# Patient Record
Sex: MALE | Race: OTHER | HISPANIC OR LATINO | URBAN - METROPOLITAN AREA
[De-identification: names, ages, dates, MRNs, and addresses within clinical notes are randomized per-mention and may not be internally consistent; named-entity substitution may affect disease eponyms.]

---

## 2017-07-19 ENCOUNTER — INPATIENT (INPATIENT)
Facility: HOSPITAL | Age: 52
LOS: 3 days | Discharge: HOME | DRG: 460 | End: 2017-07-23
Attending: ORTHOPAEDIC SURGERY | Admitting: ORTHOPAEDIC SURGERY
Payer: OTHER MISCELLANEOUS

## 2017-07-19 VITALS
TEMPERATURE: 97 F | SYSTOLIC BLOOD PRESSURE: 116 MMHG | OXYGEN SATURATION: 98 % | RESPIRATION RATE: 16 BRPM | WEIGHT: 315 LBS | HEIGHT: 64 IN | DIASTOLIC BLOOD PRESSURE: 66 MMHG | HEART RATE: 70 BPM

## 2017-07-19 DIAGNOSIS — M43.06 SPONDYLOLYSIS, LUMBAR REGION: ICD-10-CM

## 2017-07-19 DIAGNOSIS — Z98.890 OTHER SPECIFIED POSTPROCEDURAL STATES: Chronic | ICD-10-CM

## 2017-07-19 LAB
MRSA PCR RESULT.: NEGATIVE — SIGNIFICANT CHANGE UP
S AUREUS DNA NOSE QL NAA+PROBE: POSITIVE

## 2017-07-19 RX ORDER — OXYCODONE HYDROCHLORIDE 5 MG/1
5 TABLET ORAL EVERY 4 HOURS
Qty: 0 | Refills: 0 | Status: DISCONTINUED | OUTPATIENT
Start: 2017-07-19 | End: 2017-07-23

## 2017-07-19 RX ORDER — SENNA PLUS 8.6 MG/1
2 TABLET ORAL AT BEDTIME
Qty: 0 | Refills: 0 | Status: DISCONTINUED | OUTPATIENT
Start: 2017-07-19 | End: 2017-07-23

## 2017-07-19 RX ORDER — OXYCODONE HYDROCHLORIDE 5 MG/1
10 TABLET ORAL EVERY 4 HOURS
Qty: 0 | Refills: 0 | Status: DISCONTINUED | OUTPATIENT
Start: 2017-07-19 | End: 2017-07-23

## 2017-07-19 RX ORDER — HYDROMORPHONE HYDROCHLORIDE 2 MG/ML
0.5 INJECTION INTRAMUSCULAR; INTRAVENOUS; SUBCUTANEOUS
Qty: 0 | Refills: 0 | Status: DISCONTINUED | OUTPATIENT
Start: 2017-07-19 | End: 2017-07-23

## 2017-07-19 RX ORDER — METOCLOPRAMIDE HCL 10 MG
10 TABLET ORAL ONCE
Qty: 0 | Refills: 0 | Status: DISCONTINUED | OUTPATIENT
Start: 2017-07-19 | End: 2017-07-23

## 2017-07-19 RX ORDER — SODIUM CHLORIDE 9 MG/ML
1000 INJECTION, SOLUTION INTRAVENOUS
Qty: 0 | Refills: 0 | Status: DISCONTINUED | OUTPATIENT
Start: 2017-07-19 | End: 2017-07-23

## 2017-07-19 RX ORDER — ACETAMINOPHEN 500 MG
650 TABLET ORAL EVERY 6 HOURS
Qty: 0 | Refills: 0 | Status: DISCONTINUED | OUTPATIENT
Start: 2017-07-19 | End: 2017-07-23

## 2017-07-19 RX ORDER — DOCUSATE SODIUM 100 MG
100 CAPSULE ORAL THREE TIMES A DAY
Qty: 0 | Refills: 0 | Status: DISCONTINUED | OUTPATIENT
Start: 2017-07-19 | End: 2017-07-23

## 2017-07-19 RX ORDER — MORPHINE SULFATE 50 MG/1
4 CAPSULE, EXTENDED RELEASE ORAL
Qty: 0 | Refills: 0 | Status: DISCONTINUED | OUTPATIENT
Start: 2017-07-19 | End: 2017-07-23

## 2017-07-19 RX ORDER — FAMOTIDINE 10 MG/ML
20 INJECTION INTRAVENOUS EVERY 12 HOURS
Qty: 0 | Refills: 0 | Status: DISCONTINUED | OUTPATIENT
Start: 2017-07-19 | End: 2017-07-23

## 2017-07-19 RX ORDER — CEFAZOLIN SODIUM 1 G
2000 VIAL (EA) INJECTION EVERY 8 HOURS
Qty: 0 | Refills: 0 | Status: COMPLETED | OUTPATIENT
Start: 2017-07-19 | End: 2017-07-20

## 2017-07-19 RX ORDER — MAGNESIUM HYDROXIDE 400 MG/1
30 TABLET, CHEWABLE ORAL EVERY 12 HOURS
Qty: 0 | Refills: 0 | Status: DISCONTINUED | OUTPATIENT
Start: 2017-07-19 | End: 2017-07-23

## 2017-07-19 RX ORDER — BUPIVACAINE 13.3 MG/ML
20 INJECTION, SUSPENSION, LIPOSOMAL INFILTRATION ONCE
Qty: 0 | Refills: 0 | Status: DISCONTINUED | OUTPATIENT
Start: 2017-07-19 | End: 2017-07-19

## 2017-07-19 RX ORDER — ONDANSETRON 8 MG/1
4 TABLET, FILM COATED ORAL EVERY 4 HOURS
Qty: 0 | Refills: 0 | Status: DISCONTINUED | OUTPATIENT
Start: 2017-07-19 | End: 2017-07-23

## 2017-07-19 RX ADMIN — Medication 650 MILLIGRAM(S): at 18:33

## 2017-07-19 RX ADMIN — OXYCODONE HYDROCHLORIDE 10 MILLIGRAM(S): 5 TABLET ORAL at 22:22

## 2017-07-19 RX ADMIN — Medication 100 MILLIGRAM(S): at 21:22

## 2017-07-19 RX ADMIN — Medication 100 MILLIGRAM(S): at 21:55

## 2017-07-19 RX ADMIN — SENNA PLUS 2 TABLET(S): 8.6 TABLET ORAL at 21:22

## 2017-07-19 RX ADMIN — HYDROMORPHONE HYDROCHLORIDE 0.5 MILLIGRAM(S): 2 INJECTION INTRAMUSCULAR; INTRAVENOUS; SUBCUTANEOUS at 17:40

## 2017-07-19 RX ADMIN — OXYCODONE HYDROCHLORIDE 10 MILLIGRAM(S): 5 TABLET ORAL at 21:22

## 2017-07-19 RX ADMIN — HYDROMORPHONE HYDROCHLORIDE 0.5 MILLIGRAM(S): 2 INJECTION INTRAMUSCULAR; INTRAVENOUS; SUBCUTANEOUS at 18:24

## 2017-07-19 RX ADMIN — SODIUM CHLORIDE 100 MILLILITER(S): 9 INJECTION, SOLUTION INTRAVENOUS at 17:40

## 2017-07-19 NOTE — DISCHARGE NOTE ADULT - MEDICATION SUMMARY - MEDICATIONS TO TAKE
I will START or STAY ON the medications listed below when I get home from the hospital:    acetaminophen-oxycodone 325 mg-10 mg oral tablet  -- 0.5-1 tab(s) by mouth every 4 hours, As Needed -for severe pain MDD:6  -- Caution federal law prohibits the transfer of this drug to any person other  than the person for whom it was prescribed.  May cause drowsiness.  Alcohol may intensify this effect.  Use care when operating dangerous machinery.  This prescription cannot be refilled.  This product contains acetaminophen.  Do not use  with any other product containing acetaminophen to prevent possible liver damage.  Using more of this medication than prescribed may cause serious breathing problems.    -- Indication: For pain medication

## 2017-07-19 NOTE — DISCHARGE NOTE ADULT - PATIENT PORTAL LINK FT
“You can access the Follow[employer] Patient Portal, offered by [facility], by registering with the following website: [email or web address]”

## 2017-07-19 NOTE — DISCHARGE NOTE ADULT - CARE PROVIDER_API CALL
Lisa Hernandez), Orthopaedic Surgery  [address] [place] [address]  Phone: [phone]  Fax: [phone]

## 2017-07-19 NOTE — H&P ADULT - HISTORY OF PRESENT ILLNESS
51M with low back pain since a work injury in June 2016 where he was lifting heavy stone. Pain radiates from buttocks down RLE and pt endorses diminished sensation to his right foot and ankle at baseline. Pain worsens with prolonged sitting. Denies weakness or tingling. Presents today for L4-L5 Lami TLIF PSF.

## 2017-07-19 NOTE — H&P ADULT - NSHPPHYSICALEXAM_GEN_ALL_CORE
PE: Normal head, atraumatic. Normal skin, no rashes/lesions/erythema.  MSK: No deformity or open wounds. No rashes or lesions. EHL/TA/GS/FHL 5/5 BLE. Sensation intact BLE but diminished to right ankle and foot compared to left. Skin warm and well perfused. DP palpable BLE. Cap refill brisk.   Rest of PE per medical clearance.

## 2017-07-19 NOTE — PROGRESS NOTE ADULT - SUBJECTIVE AND OBJECTIVE BOX
Orthopaedics Post Op Check    Procedure: TLIF L4-5  Surgeon:Mary    Pt comfortable, without complaints  Denies CP, SOB, N/V, numbness/tingling     Vital Signs Last 24 Hrs  T(C): 36.2 (19 Jul 2017 16:05), Max: 36.2 (19 Jul 2017 10:25)  T(F): 97.2 (19 Jul 2017 16:05), Max: 97.2 (19 Jul 2017 16:05)  HR: 84 (19 Jul 2017 17:20) (70 - 99)  BP: 94/52 (19 Jul 2017 17:20) (93/63 - 116/66)  BP(mean): --  RR: 12 (19 Jul 2017 17:20) (12 - 17)  SpO2: 97% (19 Jul 2017 17:20) (95% - 100%)  AVSS, NAD    Dressing C/D/I  General: Pt Alert and oriented   wwp  Sensation: silt  Motor:  5/5 b/l LE EHL/FHL/TA/GS only limited by pain        A/P: 51yMale POD#0 s/p  above  - 2 HV  - Stable  - Pain Control  - DVT ppx: SCD  - Post op abx: periop   - PT, WBS:  wbat  - F/U AM Labs

## 2017-07-19 NOTE — DISCHARGE NOTE ADULT - ADDITIONAL INSTRUCTIONS
No strenuous activity, heavy lifting, bending, twisting, driving, tub bathing, or returning to work until cleared by MD.  You may shower  Change dressing daily.  Remove dressing after post op day 7, then leave incision open to air.  Follow up with Dr. Hernandez in his office in 2 weeks from surgery.  Any staples/sutures will be removed in his office.   If you don't have a bowel movement by post op day 3, then take Milk of Magnesia (over the counter).  If no bowel movement by at least post op day 5, then use a Dulcolax suppository (over the counter) and/or a Fleets enema--if still no bowel movement, call your MD.  Contact your doctor if you experience: fever greater than 101.5, chills, chest pain, difficulty breathing, bleeding, redness or heat around the incision.    Please follow up with your primary care provider.

## 2017-07-19 NOTE — H&P ADULT - NSHPLABSRESULTS_GEN_ALL_CORE
Preop CBC, BMP, PT/PTT/INR, UA within normal limits- reviewed by medical clearance.   Preop EKG: NSR, reviewed by medical clearance.   CXR: Within normal limits, per medical clearance.   Spirometry: Normal, per medical clearance.

## 2017-07-19 NOTE — DISCHARGE NOTE ADULT - CARE PLAN
Principal Discharge DX:	Spondylolysis of lumbar region  Goal:	improvement after surgery  Instructions for follow-up, activity and diet:	see below

## 2017-07-20 LAB
ANION GAP SERPL CALC-SCNC: 11 MMOL/L — SIGNIFICANT CHANGE UP (ref 5–17)
APPEARANCE UR: CLEAR — SIGNIFICANT CHANGE UP
BILIRUB UR-MCNC: NEGATIVE — SIGNIFICANT CHANGE UP
BUN SERPL-MCNC: 15 MG/DL — SIGNIFICANT CHANGE UP (ref 7–23)
CALCIUM SERPL-MCNC: 8.5 MG/DL — SIGNIFICANT CHANGE UP (ref 8.4–10.5)
CHLORIDE SERPL-SCNC: 101 MMOL/L — SIGNIFICANT CHANGE UP (ref 96–108)
CO2 SERPL-SCNC: 24 MMOL/L — SIGNIFICANT CHANGE UP (ref 22–31)
COLOR SPEC: SIGNIFICANT CHANGE UP
CREAT SERPL-MCNC: 1 MG/DL — SIGNIFICANT CHANGE UP (ref 0.5–1.3)
DIFF PNL FLD: NEGATIVE — SIGNIFICANT CHANGE UP
GLUCOSE SERPL-MCNC: 129 MG/DL — HIGH (ref 70–99)
GLUCOSE UR QL: NEGATIVE — SIGNIFICANT CHANGE UP
HCT VFR BLD CALC: 38.1 % — LOW (ref 39–50)
HGB BLD-MCNC: 12.6 G/DL — LOW (ref 13–17)
KETONES UR-MCNC: NEGATIVE — SIGNIFICANT CHANGE UP
LEUKOCYTE ESTERASE UR-ACNC: NEGATIVE — SIGNIFICANT CHANGE UP
LYMPHOCYTES # BLD AUTO: 9.3 % — LOW (ref 13–44)
MCHC RBC-ENTMCNC: 27.4 PG — SIGNIFICANT CHANGE UP (ref 27–34)
MCHC RBC-ENTMCNC: 33.1 G/DL — SIGNIFICANT CHANGE UP (ref 32–36)
MCV RBC AUTO: 82.8 FL — SIGNIFICANT CHANGE UP (ref 80–100)
MONOCYTES NFR BLD AUTO: 6.7 % — SIGNIFICANT CHANGE UP (ref 2–14)
NEUTROPHILS NFR BLD AUTO: 84 % — HIGH (ref 43–77)
NITRITE UR-MCNC: NEGATIVE — SIGNIFICANT CHANGE UP
PH UR: 7.5 — SIGNIFICANT CHANGE UP (ref 5–8)
PLATELET # BLD AUTO: 266 K/UL — SIGNIFICANT CHANGE UP (ref 150–400)
POTASSIUM SERPL-MCNC: 4.3 MMOL/L — SIGNIFICANT CHANGE UP (ref 3.5–5.3)
POTASSIUM SERPL-SCNC: 4.3 MMOL/L — SIGNIFICANT CHANGE UP (ref 3.5–5.3)
PROT UR-MCNC: NEGATIVE MG/DL — SIGNIFICANT CHANGE UP
RBC # BLD: 4.6 M/UL — SIGNIFICANT CHANGE UP (ref 4.2–5.8)
RBC # FLD: 13 % — SIGNIFICANT CHANGE UP (ref 10.3–16.9)
SODIUM SERPL-SCNC: 136 MMOL/L — SIGNIFICANT CHANGE UP (ref 135–145)
SP GR SPEC: 1.01 — SIGNIFICANT CHANGE UP (ref 1–1.03)
UROBILINOGEN FLD QL: 1 E.U./DL — SIGNIFICANT CHANGE UP
WBC # BLD: 10.2 K/UL — SIGNIFICANT CHANGE UP (ref 3.8–10.5)
WBC # FLD AUTO: 10.2 K/UL — SIGNIFICANT CHANGE UP (ref 3.8–10.5)

## 2017-07-20 RX ORDER — PHENAZOPYRIDINE HCL 100 MG
100 TABLET ORAL ONCE
Qty: 0 | Refills: 0 | Status: COMPLETED | OUTPATIENT
Start: 2017-07-20 | End: 2017-07-20

## 2017-07-20 RX ORDER — ACETAMINOPHEN 500 MG
975 TABLET ORAL EVERY 8 HOURS
Qty: 0 | Refills: 0 | Status: DISCONTINUED | OUTPATIENT
Start: 2017-07-20 | End: 2017-07-23

## 2017-07-20 RX ADMIN — Medication 100 MILLIGRAM(S): at 11:11

## 2017-07-20 RX ADMIN — OXYCODONE HYDROCHLORIDE 5 MILLIGRAM(S): 5 TABLET ORAL at 21:32

## 2017-07-20 RX ADMIN — OXYCODONE HYDROCHLORIDE 5 MILLIGRAM(S): 5 TABLET ORAL at 12:10

## 2017-07-20 RX ADMIN — Medication 100 MILLIGRAM(S): at 21:32

## 2017-07-20 RX ADMIN — OXYCODONE HYDROCHLORIDE 5 MILLIGRAM(S): 5 TABLET ORAL at 11:11

## 2017-07-20 RX ADMIN — Medication 5 MILLIGRAM(S): at 17:08

## 2017-07-20 RX ADMIN — Medication 100 MILLIGRAM(S): at 05:22

## 2017-07-20 RX ADMIN — Medication 100 MILLIGRAM(S): at 05:23

## 2017-07-20 RX ADMIN — Medication 975 MILLIGRAM(S): at 17:08

## 2017-07-20 RX ADMIN — Medication 30 MILLILITER(S): at 17:09

## 2017-07-20 RX ADMIN — SENNA PLUS 2 TABLET(S): 8.6 TABLET ORAL at 21:32

## 2017-07-20 RX ADMIN — Medication 100 MILLIGRAM(S): at 13:58

## 2017-07-20 NOTE — PROGRESS NOTE ADULT - SUBJECTIVE AND OBJECTIVE BOX
Did well o/n.     Procedure: TLIF L4-5  Surgeon:Mary    Pt comfortable, without complaints  Denies CP, SOB, N/V, numbness/tingling     Vital Signs Last 24 Hrs  T(C): 36.8 (20 Jul 2017 05:22), Max: 36.8 (19 Jul 2017 20:24)  T(F): 98.3 (20 Jul 2017 05:22), Max: 98.3 (19 Jul 2017 20:24)  HR: 99 (20 Jul 2017 05:22) (70 - 99)  BP: 123/38 (20 Jul 2017 05:22) (93/63 - 123/38)  BP(mean): --  RR: 16 (20 Jul 2017 05:22) (12 - 17)  SpO2: 96% (20 Jul 2017 05:22) (95% - 100%)    Dressing C/D/I  General: Pt Alert and oriented   wwp  Sensation: silt  Motor:  5/5 b/l LE EHL/FHL/TA/GS only limited by pain        A/P: 51yMale POD#1 s/p  above  - 2 HV  - Stable  - Pain Control  - DVT ppx: SCD  - Post op abx: periop   - PT, WBS:  wbat  - F/U AM Labs

## 2017-07-20 NOTE — PHYSICAL THERAPY INITIAL EVALUATION ADULT - CRITERIA FOR SKILLED THERAPEUTIC INTERVENTIONS
functional limitations in following categories/anticipated equipment needs at discharge/anticipated discharge recommendation/impairments found/predicted duration of therapy intervention/rehab potential/risk reduction/prevention/therapy frequency

## 2017-07-20 NOTE — CONSULT NOTE ADULT - SUBJECTIVE AND OBJECTIVE BOX
Pain Management Consult Note - [facility] [phone]    Chief Complaint:  Back pain    HPI:  51M with low back pain since a work injury in June 2016 where he was lifting heavy stone. Pain radiates from buttocks down RLE and pt endorses diminished sensation to his right foot and ankle at baseline. Pain worsens with prolonged sitting. Denies weakness or tingling. Seen today S/P L4-L5 Lami TLIF PSF yesterday and is doing well.  Pt. complains of incisional back pain and a sore throat.  States pain medications are helpful.        Pain is _x__ sharp ____dull ___burning ___achy ___ Intensity: ____ mild ___mod ___severe     Location _x___surgical site ____cervical __x___lumbar ____abd ____upper ext____lower ext throat    Worse with __x__activity ___x_movement _____physical therapy___ Rest    Improved with ___x_medication __x__rest ____physical therapy    ROS: Const:  ___febrile   Eyes:___ENT:___CV: ___chest pain  Resp: ____sob  GI:_-__nausea __-_vomiting _-__abd pain ___npo ___clears __full diet __bm  :___ Musk: _+__pain ___spasm  Skin:___ Neuro:  _-__jypjtckr_-__fjrazirab__-_ numbness _-__weakness ___paresth  Psych:__anxiety  Endo:___ Heme:___Allergy:____NKDA_____, ___all others reviewed and negative    PAST MEDICAL & SURGICAL HISTORY:  Constipation  Spondylolysis of lumbar region  H/O Spinal surgery: x 3      SH: _denies__Tobacco   denies___Alcohol                          FH:FAMILY HISTORY:      lactated ringers. 1000 milliLiter(s) IV Continuous <Continuous>  acetaminophen   Tablet 650 milliGRAM(s) Oral every 6 hours PRN  acetaminophen   Tablet. 650 milliGRAM(s) Oral every 6 hours PRN  morphine  - Injectable 4 milliGRAM(s) IV Push every 15 minutes PRN  aluminum hydroxide/magnesium hydroxide/simethicone Suspension 30 milliLiter(s) Oral every 12 hours PRN  famotidine    Tablet 20 milliGRAM(s) Oral every 12 hours PRN  metoclopramide Injectable 10 milliGRAM(s) IV Push once PRN  ondansetron   Disintegrating Tablet 4 milliGRAM(s) Oral every 4 hours PRN  docusate sodium 100 milliGRAM(s) Oral three times a day  magnesium hydroxide Suspension 30 milliLiter(s) Oral every 12 hours PRN  senna 2 Tablet(s) Oral at bedtime  oxyCODONE    IR 5 milliGRAM(s) Oral every 4 hours PRN  oxyCODONE    IR 10 milliGRAM(s) Oral every 4 hours PRN  HYDROmorphone  Injectable 0.5 milliGRAM(s) IV Push every 2 hours PRN  phenazopyridine 100 milliGRAM(s) Oral once      T(C): 37 (07-20-17 @ 08:54), Max: 37 (07-20-17 @ 08:54)  HR: 106 (07-20-17 @ 08:54) (70 - 106)  BP: 120/64 (07-20-17 @ 08:54) (93/63 - 123/38)  RR: 15 (07-20-17 @ 08:54) (12 - 17)  SpO2: 99% (07-20-17 @ 08:54) (95% - 100%)  Wt(kg): --    T(C): 37 (07-20-17 @ 08:54), Max: 37 (07-20-17 @ 08:54)  HR: 106 (07-20-17 @ 08:54) (70 - 106)  BP: 120/64 (07-20-17 @ 08:54) (93/63 - 123/38)  RR: 15 (07-20-17 @ 08:54) (12 - 17)  SpO2: 99% (07-20-17 @ 08:54) (95% - 100%)  Wt(kg): --    T(C): 37 (07-20-17 @ 08:54), Max: 37 (07-20-17 @ 08:54)  HR: 106 (07-20-17 @ 08:54) (70 - 106)  BP: 120/64 (07-20-17 @ 08:54) (93/63 - 123/38)  RR: 15 (07-20-17 @ 08:54) (12 - 17)  SpO2: 99% (07-20-17 @ 08:54) (95% - 100%)  Wt(kg): --    PHYSICAL EXAM:  Gen Appearance: _x__no acute distress _x__appropriate        Neuro: _x__SILT feet__x__ EOM Intact Psych: AAOX_3_, _x__mood/affect appropriate        Eyes: _x__conjunctiva WNL  __x___ Pupils equal and round        ENT: __x_ears and nose atraumatic_x__ Hearing grossly intact        Neck: ___trachea midline, no visible masses ___thyroid without palpable mass    Resp: _x__Nml WOB____No tactile fremitus ___clear to auscultation    Cardio: ___extremities free from edema ____pedal pulses palpable    GI/Abdomen: __x_soft __x___ Nontender______Nondistended_____HSM    Lymphatic: ___no palpable nodes in neck  ___no palpable nodes calves and feet    Skin/Wound: ___Incision, ___Dressing c/d/i,   ____surrounding tissues soft,  _x__drain/chest tube present____    Muscular: EHL __5_/5  Gastrocnemius___/5    _x__absent clubbing/cyanosis      ASSESSMENT: This is a 51y old Male with a history of   Constipation  Spondylolysis of lumbar region  Lumbar stenosis  Spondylolysis of lumbar region: Spondylolysis of lumbar region  Transforaminal lumbar interbody fusion (TLIF): L4-L5  H/O Spinal surgery: x 3  and back pain S/P TLIF L4-L5 and is doing well on the current regimen.    Recommended Treatment PLAN:  1.  Tylenol 975 mg po q8h  2.  Oxycodone 5-10 mg po q4h prn  3.  Dilaudid 0.5 mg IV q2h prn

## 2017-07-20 NOTE — PROGRESS NOTE ADULT - SUBJECTIVE AND OBJECTIVE BOX
ORTHO NOTE    [X ] Pt seen/examined.  [ ] Pt without any complaints/in NAD.    [X ] Pt complains of:  incisional pain controlled w/meds.  Walked in the halls w/PT.      ROS: [ ] Fever  [ ] Chills  [ ] CP [ ] SOB [ ] Dysnea  [ ] Palpitations [ ] Cough [ ] N/V/C/D [ ] Paresthia [ ] Other     [X ] ROS  otherwise negative    .    PHYSICAL EXAM:    Vital Signs Last 24 Hrs  T(C): 37 (20 Jul 2017 08:54), Max: 37 (20 Jul 2017 08:54)  T(F): 98.6 (20 Jul 2017 08:54), Max: 98.6 (20 Jul 2017 08:54)  HR: 106 (20 Jul 2017 08:54) (80 - 106)  BP: 120/64 (20 Jul 2017 08:54) (93/63 - 123/38)  BP(mean): --  RR: 15 (20 Jul 2017 08:54) (12 - 17)  SpO2: 99% (20 Jul 2017 08:54) (95% - 100%)    I&O's Detail    19 Jul 2017 07:01  -  20 Jul 2017 07:00  --------------------------------------------------------  IN:    lactated ringers.: 200 mL  Total IN: 200 mL    OUT:    Accordian: 140 mL    Accordian: 110 mL    Indwelling Catheter - Urethral: 1675 mL  Total OUT: 1925 mL    Total NET: -1725 mL      20 Jul 2017 07:01  -  20 Jul 2017 12:06  --------------------------------------------------------  IN:  Total IN: 0 mL    OUT:    Voided: 400 mL  Total OUT: 400 mL    Total NET: -400 mL           CAPILLARY BLOOD GLUCOSE                      Neuro:  NAD A and O x 3    Lungs:    CV:    ABD:     Ext:  LE TA/GS/EHL/FHL 5/5 silt b/l    2 HV in place    LABS                        12.6   10.2  )-----------( 266      ( 20 Jul 2017 06:13 )             38.1                                07-20    136  |  101  |  15  ----------------------------<  129<H>  4.3   |  24  |  1.00    Ca    8.5      20 Jul 2017 06:13        [ ] Other Labs  [ ] None ordered            Please check or [place] when present:  •  Heart Failure:    [ ] Acute        [ ]  Acute on Chronic        [ ] Chronic         [ ] Diastolic     [ ]  Combined    •  MARGARITA:     [ ] ATN        [ ]  Renal medullary necrosis       [ ]  Renal cortical necrosis                  [ ] Other pathological Lesion:  •  CKD:  [ ] Stage I   [ ] Stage II  [ ] Stage III    [ ]Stage IV   [ ]  CKD V   [ ]  Other/Unspecified:    •  Abdominal Nutritional Status:   [ ] Malnutrition-See Nutrition note    [ ] Cachexia   [ ]  Other        [ ] Supplement ordered:            [ ] Morbid Obesity: BMI >=40         ASSESSMENT/PLAN:      STATUS POST: TLIF L4 to L5 pod 1    CONTINUE:          [ ] PT wbat    [ ] DVT PPX- scd    [ ] Pain Mgt service is following    [ ] Dispo plan- home    Con't HVs.  Passed his TOV.  IS use.  Bowel regimen.

## 2017-07-20 NOTE — PHYSICAL THERAPY INITIAL EVALUATION ADULT - GENERAL OBSERVATIONS, REHAB EVAL
Patient supine in bed, surgical incision d/c/i, +2 hemovac, +cb Patient supine in bed, NAD, surgical incision d/c/i, +2 hemovac, +cb, RN Yady lund.

## 2017-07-21 LAB
ANION GAP SERPL CALC-SCNC: 9 MMOL/L — SIGNIFICANT CHANGE UP (ref 5–17)
BASOPHILS NFR BLD AUTO: 0.1 % — SIGNIFICANT CHANGE UP (ref 0–2)
BUN SERPL-MCNC: 13 MG/DL — SIGNIFICANT CHANGE UP (ref 7–23)
CALCIUM SERPL-MCNC: 8.6 MG/DL — SIGNIFICANT CHANGE UP (ref 8.4–10.5)
CHLORIDE SERPL-SCNC: 100 MMOL/L — SIGNIFICANT CHANGE UP (ref 96–108)
CO2 SERPL-SCNC: 27 MMOL/L — SIGNIFICANT CHANGE UP (ref 22–31)
CREAT SERPL-MCNC: 1 MG/DL — SIGNIFICANT CHANGE UP (ref 0.5–1.3)
EOSINOPHIL NFR BLD AUTO: 0.3 % — SIGNIFICANT CHANGE UP (ref 0–6)
GLUCOSE SERPL-MCNC: 112 MG/DL — HIGH (ref 70–99)
HCT VFR BLD CALC: 38.9 % — LOW (ref 39–50)
HGB BLD-MCNC: 12.7 G/DL — LOW (ref 13–17)
LYMPHOCYTES # BLD AUTO: 14.9 % — SIGNIFICANT CHANGE UP (ref 13–44)
MCHC RBC-ENTMCNC: 27.4 PG — SIGNIFICANT CHANGE UP (ref 27–34)
MCHC RBC-ENTMCNC: 32.6 G/DL — SIGNIFICANT CHANGE UP (ref 32–36)
MCV RBC AUTO: 84 FL — SIGNIFICANT CHANGE UP (ref 80–100)
MONOCYTES NFR BLD AUTO: 10.6 % — SIGNIFICANT CHANGE UP (ref 2–14)
NEUTROPHILS NFR BLD AUTO: 74.1 % — SIGNIFICANT CHANGE UP (ref 43–77)
PLATELET # BLD AUTO: 237 K/UL — SIGNIFICANT CHANGE UP (ref 150–400)
POTASSIUM SERPL-MCNC: 4 MMOL/L — SIGNIFICANT CHANGE UP (ref 3.5–5.3)
POTASSIUM SERPL-SCNC: 4 MMOL/L — SIGNIFICANT CHANGE UP (ref 3.5–5.3)
RBC # BLD: 4.63 M/UL — SIGNIFICANT CHANGE UP (ref 4.2–5.8)
RBC # FLD: 13.3 % — SIGNIFICANT CHANGE UP (ref 10.3–16.9)
SODIUM SERPL-SCNC: 136 MMOL/L — SIGNIFICANT CHANGE UP (ref 135–145)
WBC # BLD: 10.6 K/UL — HIGH (ref 3.8–10.5)
WBC # FLD AUTO: 10.6 K/UL — HIGH (ref 3.8–10.5)

## 2017-07-21 RX ORDER — LACTULOSE 10 G/15ML
20 SOLUTION ORAL ONCE
Qty: 0 | Refills: 0 | Status: COMPLETED | OUTPATIENT
Start: 2017-07-21 | End: 2017-07-21

## 2017-07-21 RX ADMIN — SENNA PLUS 2 TABLET(S): 8.6 TABLET ORAL at 21:50

## 2017-07-21 RX ADMIN — Medication 100 MILLIGRAM(S): at 05:21

## 2017-07-21 RX ADMIN — Medication 975 MILLIGRAM(S): at 21:50

## 2017-07-21 RX ADMIN — OXYCODONE HYDROCHLORIDE 5 MILLIGRAM(S): 5 TABLET ORAL at 09:18

## 2017-07-21 RX ADMIN — Medication 5 MILLIGRAM(S): at 05:21

## 2017-07-21 RX ADMIN — Medication 975 MILLIGRAM(S): at 05:21

## 2017-07-21 RX ADMIN — LACTULOSE 20 GRAM(S): 10 SOLUTION ORAL at 10:36

## 2017-07-21 RX ADMIN — Medication 100 MILLIGRAM(S): at 21:50

## 2017-07-21 RX ADMIN — OXYCODONE HYDROCHLORIDE 5 MILLIGRAM(S): 5 TABLET ORAL at 08:18

## 2017-07-21 RX ADMIN — Medication 975 MILLIGRAM(S): at 13:34

## 2017-07-21 RX ADMIN — OXYCODONE HYDROCHLORIDE 5 MILLIGRAM(S): 5 TABLET ORAL at 14:33

## 2017-07-21 RX ADMIN — OXYCODONE HYDROCHLORIDE 5 MILLIGRAM(S): 5 TABLET ORAL at 13:33

## 2017-07-21 RX ADMIN — Medication 100 MILLIGRAM(S): at 13:34

## 2017-07-21 NOTE — PROGRESS NOTE ADULT - SUBJECTIVE AND OBJECTIVE BOX
ORTHO NOTE    [X ] Pt seen/examined at 9:40 AM.  [ ] Pt without any complaints/in NAD.    [X ] Pt complains of:  incisional pain controlled with meds.  attempted stairs today.      ROS: [ ] Fever  [ ] Chills  [ ] CP [ ] SOB [ ] Dysnea  [ ] Palpitations [ ] Cough [ ] N/V/C/D [ ] Paresthia [ ] Other     [ ] ROS  otherwise negative    .    PHYSICAL EXAM:    Vital Signs Last 24 Hrs  T(C): 36.2 (21 Jul 2017 08:28), Max: 37.2 (20 Jul 2017 15:19)  T(F): 97.2 (21 Jul 2017 08:28), Max: 99 (21 Jul 2017 04:00)  HR: 92 (21 Jul 2017 08:28) (92 - 105)  BP: 110/61 (21 Jul 2017 08:28) (110/61 - 133/79)  BP(mean): --  RR: 18 (21 Jul 2017 08:28) (16 - 18)  SpO2: 97% (21 Jul 2017 08:28) (96% - 99%)    I&O's Detail    20 Jul 2017 07:01  -  21 Jul 2017 07:00  --------------------------------------------------------  IN:  Total IN: 0 mL    OUT:    Accordian: 85 mL    Accordian: 65 mL    Voided: 850 mL  Total OUT: 1000 mL    Total NET: -1000 mL           CAPILLARY BLOOD GLUCOSE                      Neuro:  NAD A and o x 3    Lungs:    CV:    ABD: soft nt/nd +BS    Ext:  LE strength 5/5 TA/GS/EHL/FHL silt b/l    2 HV in place    LABS                        12.7   10.6  )-----------( 237      ( 21 Jul 2017 07:10 )             38.9                                07-21    136  |  100  |  13  ----------------------------<  112<H>  4.0   |  27  |  1.00    Ca    8.6      21 Jul 2017 07:10        [ ] Other Labs  [ ] None ordered            Please check or [place] when present:  •  Heart Failure:    [ ] Acute        [ ]  Acute on Chronic        [ ] Chronic         [ ] Diastolic     [ ]  Combined    •  MARGARITA:     [ ] ATN        [ ]  Renal medullary necrosis       [ ]  Renal cortical necrosis                  [ ] Other pathological Lesion:  •  CKD:  [ ] Stage I   [ ] Stage II  [ ] Stage III    [ ]Stage IV   [ ]  CKD V   [ ]  Other/Unspecified:    •  Abdominal Nutritional Status:   [ ] Malnutrition-See Nutrition note    [ ] Cachexia   [ ]  Other        [ ] Supplement ordered:            [ ] Morbid Obesity: BMI >=40         ASSESSMENT/PLAN:      STATUS POST: TLIF L4 to L5 pod 2    CONTINUE:          [ ] PT wbat    [ ] DVT PPX- scd    [ ] Pain Mgt service is following    [ ] Dispo plan- home    Still has 2 HVs.  UA normal.  IS use.  Increased bowel regimen.

## 2017-07-21 NOTE — PROGRESS NOTE ADULT - SUBJECTIVE AND OBJECTIVE BOX
Used the  telephone service ID #[id number]  Pain Management Progress Note - [facility] [phone]    HPI:  Pt. states his pain is controlled on the current regimen.  Denies side effects from oxycodone.              Pertinent PMH: Pain at: _x__Back ___Neck___Knee ___Hip ___Shoulder ___ Opioid tolerance    Pain is ___ sharp ____dull ___burning __x_achy ___ Intensity: ____ mild ____mod ____severe     Location __x___surgical site _____cervical __x___lumbar ____abd _____upper ext____lower ext    Worse with __x__activity __x__movement _____physical therapy___ Rest    Improved with _x___medication ___x_rest ____physical therapy      lactated ringers.  acetaminophen   Tablet  acetaminophen   Tablet.  oxyCODONE    5 mG/acetaminophen 325 mG  oxyCODONE    5 mG/acetaminophen 325 mG  HYDROmorphone  Injectable  morphine  - Injectable  ceFAZolin   IVPB  aluminum hydroxide/magnesium hydroxide/simethicone Suspension  famotidine    Tablet  metoclopramide Injectable  ondansetron   Disintegrating Tablet  docusate sodium  magnesium hydroxide Suspension  senna  oxyCODONE    IR  oxyCODONE    IR  HYDROmorphone  Injectable  phenazopyridine  bisacodyl  acetaminophen   Tablet  lactulose Syrup      ROS: Const:  ___febrile   Eyes:___ENT:___CV: _-__chest pain  Resp: __-__sob  GI:_-__nausea _-__vomiting __-__abd pain ___npo ___clears __+_full diet __bm  :___ Musk: _+__pain ___spasm  Skin:___ Neuro:  __-_bpjwpbhb_-__hcbyrlxdz__-__ numbness _-__weakness ___paresthesia  Psych:___anxiety  Endo:___ Heme:___Allergy:___      07-21 @ 07:1086 mL/min/1.73M2          Hemoglobin: 12.7 g/dL (07-21 @ 07:10)  Hemoglobin: 12.6 g/dL (07-20 @ 06:13)        T(C): 36.2 (07-21-17 @ 08:28), Max: 37.2 (07-20-17 @ 15:19)  HR: 92 (07-21-17 @ 08:28) (92 - 105)  BP: 110/61 (07-21-17 @ 08:28) (110/61 - 130/77)  RR: 18 (07-21-17 @ 08:28) (16 - 18)  SpO2: 97% (07-21-17 @ 08:28) (96% - 99%)  Wt(kg): --     PHYSICAL EXAM:  Gen Appearance: _x__no acute distress __x_appropriate         Neuro: _x__SILT feet__x__ EOM Intact Psych: AAOX_3_, _x__mood/affect appropriate        Eyes: _x__conjunctiva WNL  ___x__ Pupils equal and round        ENT: _x__ears and nose atraumatic_x__ Hearing grossly intact        Neck: ___trachea midline, no visible masses ___thyroid without palpable mass    Resp: _x__Nml WOB____No tactile fremitus ___clear to auscultation    Cardio: ___extremities free from edema ____pedal pulses palpable    GI/Abdomen: _x__soft ___x__ Nontender______Nondistended_____HSM    Lymphatic: ___no palpable nodes in neck  ___no palpable nodes calves and feet    Skin/Wound: ___Incision, ___Dressing c/d/i,   ____surrounding tissues soft,  __x_drain/chest tube present____    Muscular: EHL __5_/5  Gastrocnemius___/5    __x_absent clubbing/cyanosis         ASSESSMENT:  This is a 51y old Male with a history of:  Constipation  Spondylolysis of lumbar region  Lumbar stenosis  Spondylolysis of lumbar region  Transforaminal lumbar interbody fusion (TLIF)  H/O Spinal surgery  and back pain S/P TLIF L4-L5 and is doing well.      Recommended Treatment PLAN:  1.  Oxycodone 5-10 mg po q4h prn  2.  Dilaudid 0.5 mg IV q2h prn  3.  Tylenol 975 mg po q8h

## 2017-07-21 NOTE — PROGRESS NOTE ADULT - SUBJECTIVE AND OBJECTIVE BOX
Did well o/n.     Procedure: TLIF L4-5  Surgeon:Mary    Pt comfortable, without complaints  Denies CP, SOB, N/V, numbness/tingling     Vital Signs Last 24 Hrs  Vital Signs Last 24 Hrs  T(C): 37.2 (21 Jul 2017 04:00), Max: 37.2 (20 Jul 2017 15:19)  T(F): 99 (21 Jul 2017 04:00), Max: 99 (21 Jul 2017 04:00)  HR: 94 (21 Jul 2017 04:00) (94 - 106)  BP: 124/77 (21 Jul 2017 04:00) (120/64 - 133/79)  BP(mean): --  RR: 16 (21 Jul 2017 04:00) (15 - 16)  SpO2: 96% (21 Jul 2017 04:00) (96% - 99%)    Dressing C/D/I  General: Pt Alert and oriented   wwp  Sensation: silt  Motor:  5/5 b/l LE EHL/FHL/TA/GS only limited by pain        A/P: 51yMale s/p  above  - 2 HV  - Stable  - Pain Control  - DVT ppx: SCD  - Post op abx: periop   - PT, WBS:  wbat  - F/U AM Labs

## 2017-07-22 DIAGNOSIS — K59.00 CONSTIPATION, UNSPECIFIED: ICD-10-CM

## 2017-07-22 LAB
ANION GAP SERPL CALC-SCNC: 11 MMOL/L — SIGNIFICANT CHANGE UP (ref 5–17)
BASOPHILS NFR BLD AUTO: 0.1 % — SIGNIFICANT CHANGE UP (ref 0–2)
BUN SERPL-MCNC: 12 MG/DL — SIGNIFICANT CHANGE UP (ref 7–23)
CALCIUM SERPL-MCNC: 8.8 MG/DL — SIGNIFICANT CHANGE UP (ref 8.4–10.5)
CHLORIDE SERPL-SCNC: 100 MMOL/L — SIGNIFICANT CHANGE UP (ref 96–108)
CO2 SERPL-SCNC: 26 MMOL/L — SIGNIFICANT CHANGE UP (ref 22–31)
CREAT SERPL-MCNC: 0.9 MG/DL — SIGNIFICANT CHANGE UP (ref 0.5–1.3)
CULTURE RESULTS: NO GROWTH — SIGNIFICANT CHANGE UP
EOSINOPHIL NFR BLD AUTO: 0.7 % — SIGNIFICANT CHANGE UP (ref 0–6)
GLUCOSE SERPL-MCNC: 102 MG/DL — HIGH (ref 70–99)
HCT VFR BLD CALC: 36.9 % — LOW (ref 39–50)
HGB BLD-MCNC: 13 G/DL — SIGNIFICANT CHANGE UP (ref 13–17)
LYMPHOCYTES # BLD AUTO: 20.7 % — SIGNIFICANT CHANGE UP (ref 13–44)
MCHC RBC-ENTMCNC: 29 PG — SIGNIFICANT CHANGE UP (ref 27–34)
MCHC RBC-ENTMCNC: 35.2 G/DL — SIGNIFICANT CHANGE UP (ref 32–36)
MCV RBC AUTO: 82.4 FL — SIGNIFICANT CHANGE UP (ref 80–100)
MONOCYTES NFR BLD AUTO: 11.1 % — SIGNIFICANT CHANGE UP (ref 2–14)
NEUTROPHILS NFR BLD AUTO: 67.4 % — SIGNIFICANT CHANGE UP (ref 43–77)
PLATELET # BLD AUTO: 219 K/UL — SIGNIFICANT CHANGE UP (ref 150–400)
POTASSIUM SERPL-MCNC: 4.3 MMOL/L — SIGNIFICANT CHANGE UP (ref 3.5–5.3)
POTASSIUM SERPL-SCNC: 4.3 MMOL/L — SIGNIFICANT CHANGE UP (ref 3.5–5.3)
RBC # BLD: 4.48 M/UL — SIGNIFICANT CHANGE UP (ref 4.2–5.8)
RBC # FLD: 13.4 % — SIGNIFICANT CHANGE UP (ref 10.3–16.9)
SODIUM SERPL-SCNC: 137 MMOL/L — SIGNIFICANT CHANGE UP (ref 135–145)
SPECIMEN SOURCE: SIGNIFICANT CHANGE UP
WBC # BLD: 8.5 K/UL — SIGNIFICANT CHANGE UP (ref 3.8–10.5)
WBC # FLD AUTO: 8.5 K/UL — SIGNIFICANT CHANGE UP (ref 3.8–10.5)

## 2017-07-22 RX ADMIN — Medication 975 MILLIGRAM(S): at 05:43

## 2017-07-22 RX ADMIN — Medication 100 MILLIGRAM(S): at 05:43

## 2017-07-22 RX ADMIN — Medication 975 MILLIGRAM(S): at 22:04

## 2017-07-22 RX ADMIN — SENNA PLUS 2 TABLET(S): 8.6 TABLET ORAL at 22:04

## 2017-07-22 RX ADMIN — Medication 975 MILLIGRAM(S): at 14:01

## 2017-07-22 RX ADMIN — OXYCODONE HYDROCHLORIDE 5 MILLIGRAM(S): 5 TABLET ORAL at 14:01

## 2017-07-22 RX ADMIN — Medication 100 MILLIGRAM(S): at 22:04

## 2017-07-22 NOTE — CONSULT NOTE ADULT - SUBJECTIVE AND OBJECTIVE BOX
JERARDO HAMILTON      Patient is a 51y old  Male who presents with a chief complaint of low back pain (2017 13:32)      HPI:  51M with low back pain since a work injury in 2016 where he was lifting heavy stone. Pain radiates from buttocks down RLE and pt endorses diminished sensation to his right foot and ankle at baseline. Pain worsens with prolonged sitting. Denies weakness or tingling. Presents today for L4-L5 Lami TLIF PSF. (2017 11:06)      Addl  Medical issues:       HEALTH ISSUES - PROBLEM Dx:  Spondylolysis of lumbar region: Spondylolysis of lumbar region            MEDICATIONS  (STANDING):  lactated ringers. 1000 milliLiter(s) (100 mL/Hr) IV Continuous <Continuous>  docusate sodium 100 milliGRAM(s) Oral three times a day  senna 2 Tablet(s) Oral at bedtime  bisacodyl 5 milliGRAM(s) Oral every 12 hours  acetaminophen   Tablet 975 milliGRAM(s) Oral every 8 hours    MEDICATIONS  (PRN):  acetaminophen   Tablet 650 milliGRAM(s) Oral every 6 hours PRN For Temp greater than 38 C (100.4 F)  acetaminophen   Tablet. 650 milliGRAM(s) Oral every 6 hours PRN Mild Pain (1 - 3)  morphine  - Injectable 4 milliGRAM(s) IV Push every 15 minutes PRN Severe Pain (7 - 10)  aluminum hydroxide/magnesium hydroxide/simethicone Suspension 30 milliLiter(s) Oral every 12 hours PRN Indigestion  famotidine    Tablet 20 milliGRAM(s) Oral every 12 hours PRN Dyspepsia  metoclopramide Injectable 10 milliGRAM(s) IV Push once PRN Nausea and/or Vomiting  ondansetron   Disintegrating Tablet 4 milliGRAM(s) Oral every 4 hours PRN Nausea  magnesium hydroxide Suspension 30 milliLiter(s) Oral every 12 hours PRN Constipation  oxyCODONE    IR 5 milliGRAM(s) Oral every 4 hours PRN Moderate Pain (4 - 6)  oxyCODONE    IR 10 milliGRAM(s) Oral every 4 hours PRN Severe Pain (7 - 10)  HYDROmorphone  Injectable 0.5 milliGRAM(s) IV Push every 2 hours PRN breakthrough pain          PAST MEDICAL & SURGICAL HISTORY:  Constipation  Spondylolysis of lumbar region  H/O Spinal surgery: x 3      REVIEW OF SYSTEMS:  [x] As per HPI          Reviewed   no change                            Changes noted  CONSTITUTIONAL: No fever, weight loss, or fatigue  RESPIRATORY: No cough, wheezing, chills or hemoptysis; No Shortness of Breath  CARDIOVASCULAR: No chest pain, palpitations, dizziness, or leg swelling  GASTROINTESTINAL: No abdominal or epigastric pain. No nausea, vomiting, or hematemesis; No diarrhea or constipation. No melena or hematochezia.  MUSCULOSKELETAL:post op   Neuro:   Grossly  Nega  Psych        Awake  alert  [x] All others negative	  [ ] Unable to obtain      Vital Signs Last 24 Hrs  T(C): 36.5 (2017 08:48), Max: 37.8 (2017 20:06)  T(F): 97.7 (2017 08:48), Max: 100 (2017 20:06)  HR: 100 (2017 08:48) (96 - 108)  BP: 127/80 (2017 08:48) (109/71 - 127/80)  BP(mean): --  RR: 15 (2017 08:48) (15 - 17)  SpO2: 97% (2017 08:48) (96% - 98%)    PHYSICAL EXAM:      Constitutional:WNL    Eyes:    ENMT:neg    Neck:neg    Breasts:    Back:s/p durg    Respiratory:clear to A    Cardiovascular:s1s2    Gastrointestinal:soft BS present    Genitourinary:    Rectal:    Extremities: FROM    Vascular:neg    Neurological:intact    Skin:    Lymph Nodes:    Musculoskeletal: neg    Psychiatric:                              13.0   8.5   )-----------( 219      ( 2017 07:18 )             36.9     07-22    137  |  100  |  12  ----------------------------<  102<H>  4.3   |  26  |  0.90    Ca    8.8      2017 07:18            CAPILLARY BLOOD GLUCOSE        Urinalysis Basic - ( 2017 17:26 )    Color: Vilma / Appearance: Clear / S.010 / pH: x  Gluc: x / Ketone: NEGATIVE  / Bili: NEGATIVE / Urobili: 1.0 E.U./dL   Blood: x / Protein: NEGATIVE mg/dL / Nitrite: NEGATIVE   Leuk Esterase: NEGATIVE / RBC: x / WBC x   Sq Epi: x / Non Sq Epi: x / Bacteria: x      I&O's Summary    2017 07:01  -  2017 07:00  --------------------------------------------------------  IN: 0 mL / OUT: 140 mL / NET: -140 mL            ASSESSMENT/PLAN/RECOMMENDATIONS

## 2017-07-22 NOTE — PROGRESS NOTE ADULT - SUBJECTIVE AND OBJECTIVE BOX
S: Patient seen and examined. Pt. doing well, Pain endorsed but controlled. No acute events overnight.    ICU Vital Signs Last 24 Hrs  T(C): 37.8 (21 Jul 2017 20:06), Max: 37.8 (21 Jul 2017 20:06)  T(F): 100 (21 Jul 2017 20:06), Max: 100 (21 Jul 2017 20:06)  HR: 98 (21 Jul 2017 20:06) (92 - 108)  BP: 109/71 (21 Jul 2017 20:06) (109/71 - 124/77)  BP(mean): --  ABP: --  ABP(mean): --  RR: 16 (21 Jul 2017 20:06) (16 - 18)  SpO2: 98% (21 Jul 2017 20:06) (96% - 98%)    Motor: 5/5 GS/TA/EHL/FHL    Sensation: SILT   Pulses: WWP, DP/PT 2+    Dressing: C/D/I                          12.7   10.6  )-----------( 237      ( 21 Jul 2017 07:10 )             38.9         A/P:  51y Male s/p TLIF L4-5, POD# 3     -Stable  -Pain Control  -PT/WBAT  -DVT ppx: SCDs  -Advance diet as tolerated  -f/u AM labs  -Dispo: Home      Mario Urbina MD, PGY-2  [phone] S: Patient seen and examined. Pt. doing well, Pain endorsed but controlled. No acute events overnight.    ICU Vital Signs Last 24 Hrs  T(C): 37.8 (21 Jul 2017 20:06), Max: 37.8 (21 Jul 2017 20:06)  T(F): 100 (21 Jul 2017 20:06), Max: 100 (21 Jul 2017 20:06)  HR: 98 (21 Jul 2017 20:06) (92 - 108)  BP: 109/71 (21 Jul 2017 20:06) (109/71 - 124/77)  BP(mean): --  ABP: --  ABP(mean): --  RR: 16 (21 Jul 2017 20:06) (16 - 18)  SpO2: 98% (21 Jul 2017 20:06) (96% - 98%)    Motor: 5/5 GS/TA/EHL/FHL b/l  Sensation: SILT b/l  Pulses: WWP, DP/PT 2+ b/l    Dressing: C/D/I, 2 drains                          12.7   10.6  )-----------( 237      ( 21 Jul 2017 07:10 )             38.9         A/P:  51y Male s/p TLIF L4-5, POD# 3     -Stable  -Pain Control  -PT/WBAT  -DVT ppx: SCDs  -Advance diet as tolerated  -f/u AM labs  -Dispo: Home      Mario Urbina MD, PGY-2  [phone]

## 2017-07-23 VITALS
TEMPERATURE: 99 F | OXYGEN SATURATION: 96 % | DIASTOLIC BLOOD PRESSURE: 73 MMHG | HEART RATE: 103 BPM | RESPIRATION RATE: 17 BRPM | SYSTOLIC BLOOD PRESSURE: 109 MMHG

## 2017-07-23 LAB
ANION GAP SERPL CALC-SCNC: 11 MMOL/L — SIGNIFICANT CHANGE UP (ref 5–17)
BUN SERPL-MCNC: 16 MG/DL — SIGNIFICANT CHANGE UP (ref 7–23)
CALCIUM SERPL-MCNC: 9.3 MG/DL — SIGNIFICANT CHANGE UP (ref 8.4–10.5)
CHLORIDE SERPL-SCNC: 100 MMOL/L — SIGNIFICANT CHANGE UP (ref 96–108)
CO2 SERPL-SCNC: 26 MMOL/L — SIGNIFICANT CHANGE UP (ref 22–31)
CREAT SERPL-MCNC: 0.9 MG/DL — SIGNIFICANT CHANGE UP (ref 0.5–1.3)
GLUCOSE SERPL-MCNC: 103 MG/DL — HIGH (ref 70–99)
HCT VFR BLD CALC: 38.8 % — LOW (ref 39–50)
HGB BLD-MCNC: 13.5 G/DL — SIGNIFICANT CHANGE UP (ref 13–17)
MCHC RBC-ENTMCNC: 28.5 PG — SIGNIFICANT CHANGE UP (ref 27–34)
MCHC RBC-ENTMCNC: 34.8 G/DL — SIGNIFICANT CHANGE UP (ref 32–36)
MCV RBC AUTO: 82 FL — SIGNIFICANT CHANGE UP (ref 80–100)
PLATELET # BLD AUTO: 245 K/UL — SIGNIFICANT CHANGE UP (ref 150–400)
POTASSIUM SERPL-MCNC: 4.4 MMOL/L — SIGNIFICANT CHANGE UP (ref 3.5–5.3)
POTASSIUM SERPL-SCNC: 4.4 MMOL/L — SIGNIFICANT CHANGE UP (ref 3.5–5.3)
RBC # BLD: 4.73 M/UL — SIGNIFICANT CHANGE UP (ref 4.2–5.8)
RBC # FLD: 13.2 % — SIGNIFICANT CHANGE UP (ref 10.3–16.9)
SODIUM SERPL-SCNC: 137 MMOL/L — SIGNIFICANT CHANGE UP (ref 135–145)
WBC # BLD: 7.9 K/UL — SIGNIFICANT CHANGE UP (ref 3.8–10.5)
WBC # FLD AUTO: 7.9 K/UL — SIGNIFICANT CHANGE UP (ref 3.8–10.5)

## 2017-07-23 PROCEDURE — 87086 URINE CULTURE/COLONY COUNT: CPT

## 2017-07-23 PROCEDURE — C1713: CPT

## 2017-07-23 PROCEDURE — C1889: CPT

## 2017-07-23 PROCEDURE — 97161 PT EVAL LOW COMPLEX 20 MIN: CPT

## 2017-07-23 PROCEDURE — 86900 BLOOD TYPING SEROLOGIC ABO: CPT

## 2017-07-23 PROCEDURE — 86901 BLOOD TYPING SEROLOGIC RH(D): CPT

## 2017-07-23 PROCEDURE — 86850 RBC ANTIBODY SCREEN: CPT

## 2017-07-23 PROCEDURE — 97116 GAIT TRAINING THERAPY: CPT

## 2017-07-23 PROCEDURE — 85025 COMPLETE CBC W/AUTO DIFF WBC: CPT

## 2017-07-23 PROCEDURE — 81003 URINALYSIS AUTO W/O SCOPE: CPT

## 2017-07-23 PROCEDURE — 36415 COLL VENOUS BLD VENIPUNCTURE: CPT

## 2017-07-23 PROCEDURE — 76000 FLUOROSCOPY <1 HR PHYS/QHP: CPT

## 2017-07-23 PROCEDURE — 85027 COMPLETE CBC AUTOMATED: CPT

## 2017-07-23 PROCEDURE — 80048 BASIC METABOLIC PNL TOTAL CA: CPT

## 2017-07-23 PROCEDURE — 97535 SELF CARE MNGMENT TRAINING: CPT

## 2017-07-23 PROCEDURE — 95940 IONM IN OPERATNG ROOM 15 MIN: CPT

## 2017-07-23 PROCEDURE — 87641 MR-STAPH DNA AMP PROBE: CPT

## 2017-07-23 RX ADMIN — Medication 100 MILLIGRAM(S): at 14:05

## 2017-07-23 RX ADMIN — Medication 100 MILLIGRAM(S): at 05:34

## 2017-07-23 RX ADMIN — Medication 975 MILLIGRAM(S): at 14:05

## 2017-07-23 RX ADMIN — Medication 5 MILLIGRAM(S): at 05:34

## 2017-07-23 RX ADMIN — Medication 975 MILLIGRAM(S): at 05:35

## 2017-07-23 NOTE — PROGRESS NOTE ADULT - SUBJECTIVE AND OBJECTIVE BOX
S: Patient seen and examined. Pt. doing well, Pain endorsed but controlled. No acute events overnight.    ICU Vital Signs Last 24 Hrs  T(C): 36.7 (23 Jul 2017 05:40), Max: 37.3 (22 Jul 2017 15:38)  T(F): 98 (23 Jul 2017 05:40), Max: 99.2 (22 Jul 2017 15:38)  HR: 90 (23 Jul 2017 05:40) (90 - 102)  BP: 107/70 (23 Jul 2017 05:40) (107/70 - 127/80)  BP(mean): --  ABP: --  ABP(mean): --  RR: 17 (23 Jul 2017 05:40) (15 - 17)  SpO2: 96% (23 Jul 2017 05:40) (96% - 97%)    Motor: 5/5 GS/TA/EHL/FHL b/l  Sensation: SILT b/l  Pulses: WWP, DP/PT 2+ b/l    Dressing: C/D/I, 2 drains w/ minimal output                          13.5   7.9   )-----------( 245      ( 23 Jul 2017 07:42 )             38.8         A/P:  51y Male s/p TLIF L4-5, POD# 4    -Stable  -Pain Control  -PT/WBAT  -DVT ppx: SCDs  -Advance diet as tolerated  -f/u AM labs  -Dispo: Home      Mario Urbina MD, PGY-2  [phone]

## 2017-07-23 NOTE — PROGRESS NOTE ADULT - SUBJECTIVE AND OBJECTIVE BOX
Patient is a 51y old  Male who presents with a chief complaint of low back pain (19 Jul 2017 13:32)      HPI:  51M with low back pain since a work injury in June 2016 where he was lifting heavy stone. Pain radiates from buttocks down RLE and pt endorses diminished sensation to his right foot and ankle at baseline. Pain worsens with prolonged sitting. Denies weakness or tingling. Presents today for L4-L5 Lami TLIF PSF. (19 Jul 2017 11:06)    INTERVAL HPI/OVERNIGHT EVENTS:::post op    HEALTH ISSUES - PROBLEM Dx:  Constipation, unspecified constipation type: Constipation, unspecified constipation type  Spondylolysis of lumbar region: Spondylolysis of lumbar region          PAST MEDICAL & SURGICAL HISTORY:  Constipation  Spondylolysis of lumbar region  H/O Spinal surgery: x 3          Consultant NOTE  REVIEWED  (  +++ )    REVIEW OF SYSTEMS:  [x] As per HPI  CONSTITUTIONAL: No fever, weight loss, or fatigue  RESPIRATORY: No cough, wheezing, chills or hemoptysis; No Shortness of Breath  CARDIOVASCULAR: No chest pain, palpitations, dizziness, or leg swelling  GASTROINTESTINAL: No abdominal or epigastric pain. No nausea, vomiting, or hematemesis; No diarrhea or constipation. No melena or hematochezia.  MUSCULOSKELETAL: No joint pain or swelling;   PSYCH    awake, alert       [x] All others negative	  [ ] Unable to obtain          Vital Signs Last 24 Hrs  T(C): 37.1 (23 Jul 2017 15:22), Max: 37.1 (23 Jul 2017 15:22)  T(F): 98.7 (23 Jul 2017 15:22), Max: 98.7 (23 Jul 2017 15:22)  HR: 103 (23 Jul 2017 15:22) (90 - 103)  BP: 109/73 (23 Jul 2017 15:22) (107/70 - 116/77)  BP(mean): --  RR: 17 (23 Jul 2017 15:22) (16 - 19)  SpO2: 96% (23 Jul 2017 15:22) (96% - 99%)        07-22 @ 07:01  -  07-23 @ 07:00  --------------------------------------------------------  IN: 0 mL / OUT: 360 mL / NET: -360 mL      PHYSICAL EXAMINATION:                                    (    )  NO CHANGE  Appearance: Normal	  HEENT:   Normal oral mucosa, PERRL, EOMI	  Neck: Supple, + JVD/ - JVD; Carotid Bruit   Cardiovascular: Normal S1 S2, No JVD, No murmurs,   Respiratory: Lungs clear to auscultation/Decreased Breath Sounds/No Rales, Rhonchi, Wheezing	  Gastrointestinal:  Soft, Non-tender, + BS	  Skin: No rashes, No ecchymoses, No cyanosis  Extremities: Normal range of motion, No clubbing, cyanosis or edema  Vascular: Peripheral pulses palpable 2+ bilaterally  Neurologic: Non-focal  Psychiatry: A & O x 3, Mood & affect appropriate    lactated ringers. 1000 milliLiter(s) IV Continuous <Continuous>  acetaminophen   Tablet 650 milliGRAM(s) Oral every 6 hours PRN  acetaminophen   Tablet. 650 milliGRAM(s) Oral every 6 hours PRN  morphine  - Injectable 4 milliGRAM(s) IV Push every 15 minutes PRN  aluminum hydroxide/magnesium hydroxide/simethicone Suspension 30 milliLiter(s) Oral every 12 hours PRN  famotidine    Tablet 20 milliGRAM(s) Oral every 12 hours PRN  metoclopramide Injectable 10 milliGRAM(s) IV Push once PRN  ondansetron   Disintegrating Tablet 4 milliGRAM(s) Oral every 4 hours PRN  docusate sodium 100 milliGRAM(s) Oral three times a day  magnesium hydroxide Suspension 30 milliLiter(s) Oral every 12 hours PRN  senna 2 Tablet(s) Oral at bedtime  oxyCODONE    IR 5 milliGRAM(s) Oral every 4 hours PRN  oxyCODONE    IR 10 milliGRAM(s) Oral every 4 hours PRN  HYDROmorphone  Injectable 0.5 milliGRAM(s) IV Push every 2 hours PRN  bisacodyl 5 milliGRAM(s) Oral every 12 hours  acetaminophen   Tablet 975 milliGRAM(s) Oral every 8 hours                                      13.5   7.9   )-----------( 245      ( 23 Jul 2017 07:42 )             38.8     07-23    137  |  100  |  16  ----------------------------<  103<H>  4.4   |  26  |  0.90    Ca    9.3      23 Jul 2017 07:41        CAPILLARY BLOOD GLUCOSE

## 2017-07-26 DIAGNOSIS — M47.896 OTHER SPONDYLOSIS, LUMBAR REGION: ICD-10-CM

## 2017-07-26 DIAGNOSIS — M51.26 OTHER INTERVERTEBRAL DISC DISPLACEMENT, LUMBAR REGION: ICD-10-CM

## 2017-07-26 DIAGNOSIS — M53.2X6 SPINAL INSTABILITIES, LUMBAR REGION: ICD-10-CM

## 2017-07-26 DIAGNOSIS — M48.06 SPINAL STENOSIS, LUMBAR REGION: ICD-10-CM

## 2017-07-26 DIAGNOSIS — K59.00 CONSTIPATION, UNSPECIFIED: ICD-10-CM

## 2017-08-01 ENCOUNTER — OUTPATIENT (OUTPATIENT)
Dept: OUTPATIENT SERVICES | Facility: HOSPITAL | Age: 52
LOS: 1 days | End: 2017-08-01
Payer: OTHER MISCELLANEOUS

## 2017-08-01 DIAGNOSIS — Z98.890 OTHER SPECIFIED POSTPROCEDURAL STATES: Chronic | ICD-10-CM

## 2017-08-01 PROCEDURE — 72100 X-RAY EXAM L-S SPINE 2/3 VWS: CPT

## 2017-08-01 PROCEDURE — 72100 X-RAY EXAM L-S SPINE 2/3 VWS: CPT | Mod: 26

## 2017-08-02 DIAGNOSIS — E66.01 MORBID (SEVERE) OBESITY DUE TO EXCESS CALORIES: ICD-10-CM

## 2017-08-29 ENCOUNTER — OUTPATIENT (OUTPATIENT)
Dept: OUTPATIENT SERVICES | Facility: HOSPITAL | Age: 52
LOS: 1 days | End: 2017-08-29
Payer: OTHER MISCELLANEOUS

## 2017-08-29 DIAGNOSIS — Z98.890 OTHER SPECIFIED POSTPROCEDURAL STATES: Chronic | ICD-10-CM

## 2017-08-29 PROCEDURE — 72110 X-RAY EXAM L-2 SPINE 4/>VWS: CPT

## 2017-08-29 PROCEDURE — 72110 X-RAY EXAM L-2 SPINE 4/>VWS: CPT | Mod: 26

## 2017-10-10 ENCOUNTER — OUTPATIENT (OUTPATIENT)
Dept: OUTPATIENT SERVICES | Facility: HOSPITAL | Age: 52
LOS: 1 days | End: 2017-10-10
Payer: OTHER MISCELLANEOUS

## 2017-10-10 DIAGNOSIS — Z98.890 OTHER SPECIFIED POSTPROCEDURAL STATES: Chronic | ICD-10-CM

## 2017-10-10 PROCEDURE — 72100 X-RAY EXAM L-S SPINE 2/3 VWS: CPT | Mod: 26

## 2017-10-10 PROCEDURE — 72100 X-RAY EXAM L-S SPINE 2/3 VWS: CPT

## 2018-01-23 ENCOUNTER — OUTPATIENT (OUTPATIENT)
Dept: OUTPATIENT SERVICES | Facility: HOSPITAL | Age: 53
LOS: 1 days | End: 2018-01-23
Payer: OTHER MISCELLANEOUS

## 2018-01-23 DIAGNOSIS — Z98.890 OTHER SPECIFIED POSTPROCEDURAL STATES: Chronic | ICD-10-CM

## 2018-01-23 PROCEDURE — 72100 X-RAY EXAM L-S SPINE 2/3 VWS: CPT

## 2018-01-23 PROCEDURE — 72100 X-RAY EXAM L-S SPINE 2/3 VWS: CPT | Mod: 26

## 2018-04-24 NOTE — H&P ADULT - PROBLEM/PLAN-1
HEART CATHETERIZATION/ANGIOGRAPHY DISCHARGE INSTRUCTIONS    1. Check puncture site frequently for swelling or bleeding. If there is any bleeding, lie down and apply pressure over the area with a clean towel or washcloth. Notify your doctor for any redness, swelling, drainage, or oozing from the puncture site. Notify your doctor for any fever or chills. 2. If the extremity becomes cold, numb, or painful call [facility] at [phone].  3. Activity should be limited for the next 48 hours. Climb stairs as little as possible and avoid any stooping, bending, or strenuous activity for 48 hours. No heavy lifting (anything over 10 pounds) for 3 days. 4. You may resume your usual diet. Drink more fluids than usual.  5. Have a responsible person drive you home and stay with you for at least 24 hours after your heart catheterization/angiography. 6. You may remove bandage from your Right wrist in 24 hours. You may shower in 24 hours. No tub baths, hot tubs, or swimming for 1 week. Do not place any lotions, creams, powders, or ointments over puncture site for 1 week. You may place a clean band-aid over the puncture site each day for 5 days. Change daily. I have read the above instructions and have had the opportunity to ask questions.       Patient: ________________________   Date: 4/24/2018    Witness: _______________________   Date: 4/24/2018 DISPLAY PLAN FREE TEXT

## 2021-05-07 NOTE — PRE-OP CHECKLIST - HEART RATE (BEATS/MIN)
Encouraged PO intake during meals & reviewed importance - reviewed needs for intake of lean proteins, ensure clears while on clear liquids. Tips for GI provided. Understadning stated, provide additional motivation as needed./verbal instruction/patient instructed
70

## 2022-01-06 NOTE — PATIENT PROFILE ADULT. - FUNCTIONAL SCREEN CURRENT LEVEL: EATING, MLM
Patient Education     Foreign Object Under the Skin (Not Removed)  Very small particles that remain under the skin usually don’t cause problems or need further treatment. But sometimes they can cause an infection. Sometimes they work their way to the surface on their own without any problems. If you see this happening, you can remove any particles with tweezers. Be careful not to dig up the skin and make things worse.   You may need to see a surgeon if the object is large and couldn't be removed.. The surgeon can assess the injury and treat it. Sometimes a surgeon uses X-rays or ultrasound to guide them in removing the object.   Home care  Wound care  · Keep the wound clean and dry.  · If there is a dressing or bandage, change it when it gets wet or dirty. Otherwise, leave it on for the first 24 hours, then change it once a day or as often as you were instructed.  · If stitches or staples were used, clean the wound every day:  ? After taking off the dressing, wash the area gently with soap and water.  ? Apply a thin layer of antibiotic ointment to the cut. This will keep the wound clean and make it easier to remove the stitches. If it is oozing a lot, you can put a nonstick dressing over it. Then reapply the bandage or dressing as you were instructed.  ? You can get it wet, just like when you clean it. This means you can shower as usual for the first 24 hours. But don't soak the area in water (no baths or swimming) until the stitches or staples are taken out.  · If surgical tape or strips were used, keep the area clean and dry. If it becomes wet, blot it dry with a towel.  Medicine  · You can take over-the-counter medicine for pain, unless you were given a different pain medicine to use. Talk with your healthcare provider before using these medicines if you have chronic liver or kidney disease, ever had a stomach ulcer or digestive bleeding, or are taking blood-thinner medicines.  · If you were given antibiotics,  take them until they are used up. It's important to finish the antibiotics even if the wound looks better to make sure the infection clears.    Follow-up care  Follow up your healthcare provider, or as advised. Keep in mind the following:   · Watch for any signs of infection, such as increasing pain, redness, swelling, or pus drainage. If this happens, don’t wait for your scheduled visit. See your healthcare provider sooner.  · Stitches or staples are usually taken out within 5 to 14 days. This varies depending on what part of your body they are on, and the type of wound. The healthcare provider will tell you how long they should be left in.  · If surgical tape or strips were used, they are usually left on for 7 to 10 days. You can remove them after that unless you were told otherwise. If you try to remove them, and it is too difficult, soaking can help. If the edges of the cut pull apart, then stop removing the tape, and follow up with your healthcare provider.  · If X-rays were taken, you will be told if there are new findings that may affect your care.  When to seek medical advice  Call your healthcare provider right away if any of these occur:   · Fever of 100.4ºF (38ºC) or higher, or as directed by your healthcare provider  · Increasing pain in the wound  · Redness, swelling or pus coming from the wound  Lori last reviewed this educational content on 8/1/2019  © 8114-5229 [employer]. All rights reserved. This information is not intended as a substitute for professional medical care. Always follow your healthcare professional's instructions.            (0) independent

## 2024-05-17 NOTE — PATIENT PROFILE ADULT. - NS TRANSFER PATIENT BELONGINGS
[FreeTextEntry1] : - 5/16/2024 31-year-old patient who presents with concern for dysphonia.  Notes some hoarseness and pitch changes by the end of the day or even at the end of the week.  This can be resolved with voice rest.  Examination today was essentially normal.  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  We did discuss the utility of speech therapy as well.  Patient will consider this, currently working with a speech pathologist.  Patient to use nasal saline and nasal steroids as needed for sinus issues and if symptoms persist can follow-up at which point would consider imaging.  Patient can otherwise follow-up with me as needed.  -Voice hygiene - Consider our speech pathology team, will continue with own speech pathologist - Nasal saline, nasal steroids as needed for sinus issues -Otherwise follow-up with me as needed Clothing